# Patient Record
Sex: FEMALE | Race: WHITE | NOT HISPANIC OR LATINO | Employment: FULL TIME | URBAN - METROPOLITAN AREA
[De-identification: names, ages, dates, MRNs, and addresses within clinical notes are randomized per-mention and may not be internally consistent; named-entity substitution may affect disease eponyms.]

---

## 2023-12-09 DIAGNOSIS — R12 HEARTBURN: ICD-10-CM

## 2023-12-09 RX ORDER — OMEPRAZOLE 40 MG/1
40 CAPSULE, DELAYED RELEASE ORAL DAILY
Qty: 30 CAPSULE | Refills: 5 | Status: SHIPPED | OUTPATIENT
Start: 2023-12-09

## 2024-06-06 DIAGNOSIS — F41.1 GENERALIZED ANXIETY DISORDER: ICD-10-CM

## 2024-06-07 RX ORDER — PAROXETINE HYDROCHLORIDE 20 MG/1
20 TABLET, FILM COATED ORAL DAILY
Qty: 90 TABLET | Refills: 0 | Status: SHIPPED | OUTPATIENT
Start: 2024-06-07

## 2024-07-30 DIAGNOSIS — R12 HEARTBURN: ICD-10-CM

## 2024-07-30 RX ORDER — OMEPRAZOLE 40 MG/1
40 CAPSULE, DELAYED RELEASE ORAL DAILY
Qty: 30 CAPSULE | Refills: 5 | Status: SHIPPED | OUTPATIENT
Start: 2024-07-30

## 2024-09-04 ENCOUNTER — APPOINTMENT (OUTPATIENT)
Dept: PRIMARY CARE | Facility: CLINIC | Age: 64
End: 2024-09-04
Payer: COMMERCIAL

## 2024-11-18 ENCOUNTER — TELEPHONE (OUTPATIENT)
Dept: RHEUMATOLOGY | Facility: CLINIC | Age: 64
End: 2024-11-18
Payer: COMMERCIAL

## 2024-11-18 DIAGNOSIS — F41.1 GENERALIZED ANXIETY DISORDER: ICD-10-CM

## 2024-11-18 RX ORDER — PAROXETINE HYDROCHLORIDE 20 MG/1
20 TABLET, FILM COATED ORAL DAILY
Qty: 90 TABLET | Refills: 0 | Status: SHIPPED | OUTPATIENT
Start: 2024-11-18

## 2024-11-18 NOTE — TELEPHONE ENCOUNTER
Patient left a message requesting a refill on PARoxetine (Paxil) 20 mg tablet sent to her verified pharmacy, [facility] in [place].

## 2024-12-17 ENCOUNTER — APPOINTMENT (OUTPATIENT)
Dept: PRIMARY CARE | Facility: CLINIC | Age: 64
End: 2024-12-17
Payer: COMMERCIAL

## 2024-12-17 VITALS
SYSTOLIC BLOOD PRESSURE: 134 MMHG | HEIGHT: 65 IN | OXYGEN SATURATION: 98 % | BODY MASS INDEX: 27.76 KG/M2 | HEART RATE: 67 BPM | WEIGHT: 166.6 LBS | DIASTOLIC BLOOD PRESSURE: 83 MMHG | TEMPERATURE: 97.3 F

## 2024-12-17 DIAGNOSIS — F33.40 RECURRENT MAJOR DEPRESSIVE DISORDER, IN REMISSION (CMS-HCC): ICD-10-CM

## 2024-12-17 DIAGNOSIS — F41.1 GENERALIZED ANXIETY DISORDER: ICD-10-CM

## 2024-12-17 DIAGNOSIS — Z00.00 HEALTHCARE MAINTENANCE: Primary | ICD-10-CM

## 2024-12-17 DIAGNOSIS — Z12.11 ENCOUNTER FOR SCREENING FOR MALIGNANT NEOPLASM OF COLON: ICD-10-CM

## 2024-12-17 DIAGNOSIS — Z12.31 VISIT FOR SCREENING MAMMOGRAM: ICD-10-CM

## 2024-12-17 DIAGNOSIS — Z28.21 IMMUNIZATION DECLINED: ICD-10-CM

## 2024-12-17 PROBLEM — S60.511A ABRASION OF RIGHT HAND: Status: RESOLVED | Noted: 2018-11-15 | Resolved: 2024-12-17

## 2024-12-17 PROBLEM — F17.200 NICOTINE DEPENDENCE: Status: RESOLVED | Noted: 2024-12-17 | Resolved: 2024-12-17

## 2024-12-17 PROBLEM — G47.00 INSOMNIA: Status: ACTIVE | Noted: 2024-12-17

## 2024-12-17 PROBLEM — R74.8 ELEVATED LIVER ENZYMES: Status: ACTIVE | Noted: 2024-12-17

## 2024-12-17 PROBLEM — R42 VERTIGO: Status: ACTIVE | Noted: 2024-09-15

## 2024-12-17 PROBLEM — J30.2 SEASONAL ALLERGIES: Status: ACTIVE | Noted: 2024-12-17

## 2024-12-17 PROBLEM — H90.3 BILATERAL SENSORINEURAL HEARING LOSS: Status: ACTIVE | Noted: 2024-12-17

## 2024-12-17 PROBLEM — R51.9 HEADACHE: Status: ACTIVE | Noted: 2024-09-15

## 2024-12-17 PROBLEM — E16.2 HYPOGLYCEMIA: Status: ACTIVE | Noted: 2024-12-17

## 2024-12-17 PROBLEM — E78.2 MIXED HYPERLIPIDEMIA: Status: ACTIVE | Noted: 2024-12-17

## 2024-12-17 PROCEDURE — 3008F BODY MASS INDEX DOCD: CPT | Performed by: INTERNAL MEDICINE

## 2024-12-17 PROCEDURE — 99213 OFFICE O/P EST LOW 20 MIN: CPT | Performed by: INTERNAL MEDICINE

## 2024-12-17 RX ORDER — MECLIZINE HYDROCHLORIDE 25 MG/1
TABLET ORAL
COMMUNITY
Start: 2024-09-17

## 2024-12-17 RX ORDER — FAMOTIDINE 10 MG/1
10 TABLET ORAL 2 TIMES DAILY
COMMUNITY

## 2024-12-17 RX ORDER — FLUTICASONE PROPIONATE 50 MCG
SPRAY, SUSPENSION (ML) NASAL
COMMUNITY
Start: 2017-09-27

## 2024-12-17 RX ORDER — PAROXETINE HYDROCHLORIDE 20 MG/1
20 TABLET, FILM COATED ORAL DAILY
Qty: 90 TABLET | Refills: 3 | Status: SHIPPED | OUTPATIENT
Start: 2024-12-17

## 2024-12-17 RX ORDER — MINERAL OIL
ENEMA (ML) RECTAL
COMMUNITY

## 2024-12-17 ASSESSMENT — ENCOUNTER SYMPTOMS
SHORTNESS OF BREATH: 0
CHILLS: 0
NAUSEA: 0
COUGH: 0
HEADACHES: 0
ABDOMINAL PAIN: 0
BACK PAIN: 0
VOMITING: 0
CONFUSION: 0
PALPITATIONS: 0
ARTHRALGIAS: 0
CONSTIPATION: 0
FEVER: 0
DIARRHEA: 0
FATIGUE: 0

## 2024-12-17 ASSESSMENT — PATIENT HEALTH QUESTIONNAIRE - PHQ9
1. LITTLE INTEREST OR PLEASURE IN DOING THINGS: NOT AT ALL
2. FEELING DOWN, DEPRESSED OR HOPELESS: NOT AT ALL
SUM OF ALL RESPONSES TO PHQ9 QUESTIONS 1 AND 2: 0

## 2024-12-17 NOTE — PROGRESS NOTES
CHIEF COMPLAINT  Depression    HISTORY OF PRESENT ILLNESS  Corazon Moss is a 64 y.o. female presents today for follow up of Depression    HPI    Needs refill on paroxetine.  She quit vaping and is very happy.  Due for fasting labs and preventative screenings.  She declines immunizations.  Exercises regularly.     REVIEW OF SYSTEMS  Review of Systems   Constitutional:  Negative for chills, fatigue and fever.   Respiratory:  Negative for cough and shortness of breath.    Cardiovascular:  Negative for chest pain, palpitations and leg swelling.   Gastrointestinal:  Negative for abdominal pain, constipation, diarrhea, nausea and vomiting.   Musculoskeletal:  Negative for arthralgias, back pain and gait problem.   Skin:  Negative for rash.   Neurological:  Negative for headaches.   Psychiatric/Behavioral:  Negative for confusion.        ALLERGIES  Penicillins, Sulfa (sulfonamide antibiotics), Azithromycin, and Diphenhydramine    MEDICATIONS  Current Outpatient Medications   Medication Instructions    cetirizine (ZYRTEC) 10 mg capsule Take by mouth.    famotidine (PEPCID) 10 mg, 2 times daily    fexofenadine (Allegra) 180 mg tablet Take by mouth.    fluticasone (Flonase) 50 mcg/actuation nasal spray Administer into affected nostril(s).    L. acidophilus/Bifid. animalis 32 billion cell capsule 1 capsule, Daily    meclizine (Antivert) 25 mg tablet TAKE 1 TABLET BY MOUTH THREE TIMES DAILY for TEN days as needed for dizziness    omeprazole (PRILOSEC) 40 mg, oral, Daily    PARoxetine (PAXIL) 20 mg, oral, Daily       TOBACCO USE  Social History     Tobacco Use   Smoking Status Former    Current packs/day: 0.00    Types: Cigarettes    Quit date: 2024    Years since quittin.8   Smokeless Tobacco Never       DEPRESSION SCREEN  Over the past 2 weeks, how often have you been bothered by any of the following problems?  Little interest or pleasure in doing things: Not at all  Feeling down, depressed, or hopeless: Not at  "all    SURGICAL HISTORY  Past Surgical History:  06/14/2017: KNEE ARTHROSCOPY W/ DEBRIDEMENT      Comment:  Arthroscopy Knee Right  02/18/2019: OTHER SURGICAL HISTORY      Comment:  Esophagogastroduodenoscopy  09/26/2019: OTHER SURGICAL HISTORY      Comment:  Oral surgery  08/18/2020: OTHER SURGICAL HISTORY      Comment:  Complete colonoscopy  01/24/2019: OTHER SURGICAL HISTORY      Comment:  Complete colonoscopy  01/24/2019: OTHER SURGICAL HISTORY      Comment:  Esophagogastroduodenoscopy  12/14/2022: OTHER SURGICAL HISTORY      Comment:  Esophagogastroduodenoscopy  06/14/2017: OTHER SURGICAL HISTORY      Comment:  Bunion Correction By Chambers Procedure  06/14/2017: OTHER SURGICAL HISTORY      Comment:  Arthroscopy Shoulder Left  06/14/2017: TONSILLECTOMY      Comment:  Tonsillectomy  06/14/2017: TUBAL LIGATION      Comment:  Tubal Ligation       OBJECTIVE    /83   Pulse 67   Temp 36.3 °C (97.3 °F)   Ht 1.651 m (5' 5\")   Wt 75.6 kg (166 lb 9.6 oz)   SpO2 98%   BMI 27.72 kg/m²    BMI: Estimated body mass index is 27.72 kg/m² as calculated from the following:    Height as of this encounter: 1.651 m (5' 5\").    Weight as of this encounter: 75.6 kg (166 lb 9.6 oz).    BP Readings from Last 3 Encounters:   12/17/24 134/83   06/08/23 134/78   02/22/23 132/85      Wt Readings from Last 3 Encounters:   12/17/24 75.6 kg (166 lb 9.6 oz)   06/08/23 75.5 kg (166 lb 8 oz)   02/22/23 73.7 kg (162 lb 6 oz)        PHYSICAL EXAM  Physical Exam  Constitutional:       Appearance: Normal appearance.   HENT:      Head: Normocephalic and atraumatic.   Cardiovascular:      Rate and Rhythm: Normal rate and regular rhythm.      Pulses: Normal pulses.      Heart sounds: No murmur heard.  Pulmonary:      Effort: Pulmonary effort is normal. No respiratory distress.      Breath sounds: Normal breath sounds. No wheezing.   Abdominal:      General: There is no distension.      Palpations: Abdomen is soft.      Tenderness: There is no " abdominal tenderness.   Musculoskeletal:      Right lower leg: No edema.      Left lower leg: No edema.   Skin:     Findings: No rash.   Neurological:      General: No focal deficit present.      Mental Status: She is alert and oriented to person, place, and time. Mental status is at baseline.   Psychiatric:         Mood and Affect: Mood normal.         Behavior: Behavior normal.         Thought Content: Thought content normal.         Judgment: Judgment normal.          ASSESSMENT AND PLAN  Assessment/Plan   Problem List Items Addressed This Visit       Immunization declined    Generalized anxiety disorder    Relevant Medications    PARoxetine (Paxil) 20 mg tablet    Recurrent major depressive disorder, in remission (CMS-[facility])    Encounter for screening for malignant neoplasm of colon    Relevant Orders    Colonoscopy Screening; Average Risk Patient    Visit for screening mammogram    Relevant Orders    BI mammo bilateral screening tomosynthesis    Healthcare maintenance - Primary    Relevant Orders    Lipid Panel    Comprehensive Metabolic Panel    CBC and Auto Differential       Anxiety and Depression - stable on paroxetine, refill sent.    Aim for 30 minutes of aerobic exercise, 5 times per week.  Try to cut back on processed foods, including foods made with flour, sugar, added salt, and saturated fat.    Routine fasting labs - CBC, CMP, FLP.    Mammogram ordered.    Colonoscopy 2/2019, due 5 years, ordered.    Declines immunizations.    Follow-up  annually and PRN.

## 2025-01-02 ENCOUNTER — TELEPHONE (OUTPATIENT)
Dept: GASTROENTEROLOGY | Facility: EXTERNAL LOCATION | Age: 65
End: 2025-01-02
Payer: COMMERCIAL

## 2025-02-19 DIAGNOSIS — R12 HEARTBURN: ICD-10-CM

## 2025-02-19 RX ORDER — OMEPRAZOLE 40 MG/1
40 CAPSULE, DELAYED RELEASE ORAL DAILY
Qty: 30 CAPSULE | Refills: 5 | Status: SHIPPED | OUTPATIENT
Start: 2025-02-19

## 2025-05-27 ENCOUNTER — HOSPITAL ENCOUNTER (OUTPATIENT)
Dept: RADIOLOGY | Facility: CLINIC | Age: 65
Discharge: HOME | End: 2025-05-27
Payer: COMMERCIAL

## 2025-05-27 VITALS — BODY MASS INDEX: 27.77 KG/M2 | WEIGHT: 166.67 LBS | HEIGHT: 65 IN

## 2025-05-27 DIAGNOSIS — Z12.31 VISIT FOR SCREENING MAMMOGRAM: ICD-10-CM

## 2025-05-27 PROCEDURE — 77067 SCR MAMMO BI INCL CAD: CPT | Performed by: STUDENT IN AN ORGANIZED HEALTH CARE EDUCATION/TRAINING PROGRAM

## 2025-05-27 PROCEDURE — 77063 BREAST TOMOSYNTHESIS BI: CPT | Performed by: STUDENT IN AN ORGANIZED HEALTH CARE EDUCATION/TRAINING PROGRAM

## 2025-05-27 PROCEDURE — 77063 BREAST TOMOSYNTHESIS BI: CPT

## 2025-05-29 LAB
NON-UH HIE GP HPV: NEGATIVE
NON-UH HIE THINPREP TIS PAP: NORMAL

## 2025-08-21 DIAGNOSIS — R12 HEARTBURN: ICD-10-CM

## 2025-08-21 RX ORDER — OMEPRAZOLE 40 MG/1
40 CAPSULE, DELAYED RELEASE ORAL DAILY
Qty: 30 CAPSULE | Refills: 5 | Status: SHIPPED | OUTPATIENT
Start: 2025-08-21

## 2025-12-17 ENCOUNTER — APPOINTMENT (OUTPATIENT)
Dept: PRIMARY CARE | Facility: CLINIC | Age: 65
End: 2025-12-17
Payer: COMMERCIAL